# Patient Record
Sex: FEMALE | Race: WHITE
[De-identification: names, ages, dates, MRNs, and addresses within clinical notes are randomized per-mention and may not be internally consistent; named-entity substitution may affect disease eponyms.]

---

## 2017-05-01 ENCOUNTER — HOSPITAL ENCOUNTER (OUTPATIENT)
Dept: HOSPITAL 35 - RAD | Age: 82
End: 2017-05-01
Attending: INTERNAL MEDICINE
Payer: COMMERCIAL

## 2017-05-01 DIAGNOSIS — R06.02: Primary | ICD-10-CM

## 2017-05-01 DIAGNOSIS — J45.909: ICD-10-CM

## 2017-05-05 ENCOUNTER — HOSPITAL ENCOUNTER (EMERGENCY)
Dept: HOSPITAL 35 - ER | Age: 82
Discharge: HOME | End: 2017-05-05
Payer: COMMERCIAL

## 2017-05-05 VITALS — DIASTOLIC BLOOD PRESSURE: 73 MMHG | SYSTOLIC BLOOD PRESSURE: 138 MMHG

## 2017-05-05 VITALS — HEIGHT: 61 IN | BODY MASS INDEX: 28.32 KG/M2 | WEIGHT: 150 LBS

## 2017-05-05 DIAGNOSIS — I10: ICD-10-CM

## 2017-05-05 DIAGNOSIS — Z88.6: ICD-10-CM

## 2017-05-05 DIAGNOSIS — F41.9: ICD-10-CM

## 2017-05-05 DIAGNOSIS — E03.9: ICD-10-CM

## 2017-05-05 DIAGNOSIS — R00.0: Primary | ICD-10-CM
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
## 2017-05-05 DIAGNOSIS — Z91.040: ICD-10-CM

## 2017-05-05 DIAGNOSIS — T38.0X5A: ICD-10-CM

## 2017-05-05 DIAGNOSIS — M81.0: ICD-10-CM

## 2017-05-05 DIAGNOSIS — G47.30: ICD-10-CM

## 2017-05-05 DIAGNOSIS — J45.909: ICD-10-CM

## 2017-05-05 DIAGNOSIS — Z90.89: ICD-10-CM

## 2017-05-05 DIAGNOSIS — E78.5: ICD-10-CM

## 2017-05-05 DIAGNOSIS — Z88.0: ICD-10-CM

## 2017-05-05 LAB
ALBUMIN SERPL-MCNC: 3.7 G/DL (ref 3.4–5)
ALP SERPL-CCNC: 42 U/L (ref 46–116)
ALT SERPL-CCNC: 31 U/L (ref 30–65)
ANION GAP SERPL CALC-SCNC: 7 MMOL/L (ref 7–16)
AST SERPL-CCNC: 25 U/L (ref 15–37)
BASOPHILS NFR BLD AUTO: 0.5 % (ref 0–2)
BILIRUB SERPL-MCNC: 0.3 MG/DL
BUN SERPL-MCNC: 19 MG/DL (ref 7–18)
CALCIUM SERPL-MCNC: 9.5 MG/DL (ref 8.5–10.1)
CHLORIDE SERPL-SCNC: 100 MMOL/L (ref 98–107)
CO2 SERPL-SCNC: 26 MMOL/L (ref 21–32)
CREAT SERPL-MCNC: 0.9 MG/DL (ref 0.6–1)
EOSINOPHIL NFR BLD: 1.8 % (ref 0–3)
ERYTHROCYTE [DISTWIDTH] IN BLOOD BY AUTOMATED COUNT: 13.8 % (ref 10.5–14.5)
GLUCOSE SERPL-MCNC: 93 MG/DL (ref 74–106)
GRANULOCYTES NFR BLD MANUAL: 72.7 % (ref 36–66)
HCT VFR BLD CALC: 36.6 % (ref 37–47)
HGB BLD-MCNC: 12.2 GM/DL (ref 12–15)
LYMPHOCYTES NFR BLD AUTO: 15.4 % (ref 24–44)
MAGNESIUM SERPL-MCNC: 1.9 MG/DL (ref 1.8–2.4)
MANUAL DIFFERENTIAL PERFORMED BLD QL: NO
MCH RBC QN AUTO: 30.2 PG (ref 26–34)
MCHC RBC AUTO-ENTMCNC: 33.4 G/DL (ref 28–37)
MCV RBC: 90.3 FL (ref 80–100)
MONOCYTES NFR BLD: 9.6 % (ref 1–8)
NEUTROPHILS # BLD: 5.2 THOU/UL (ref 1.4–8.2)
NT-PRO BRAIN NAT PEPTIDE: 264 PG/ML (ref ?–300)
PLATELET # BLD: 209 THOU/UL (ref 150–400)
POTASSIUM SERPL-SCNC: 4.1 MMOL/L (ref 3.5–5.1)
PROT SERPL-MCNC: 7 G/DL (ref 6.4–8.2)
RBC # BLD AUTO: 4.05 MIL/UL (ref 4.2–5)
SODIUM SERPL-SCNC: 133 MMOL/L (ref 136–145)
TROPONIN I SERPL-MCNC: < 0.04 NG/ML
WBC # BLD AUTO: 7.2 THOU/UL (ref 4–11)

## 2017-06-16 ENCOUNTER — HOSPITAL ENCOUNTER (OUTPATIENT)
Dept: HOSPITAL 35 - CV | Age: 82
End: 2017-06-16
Attending: INTERNAL MEDICINE
Payer: COMMERCIAL

## 2017-06-16 DIAGNOSIS — I48.91: Primary | ICD-10-CM

## 2017-09-14 ENCOUNTER — HOSPITAL ENCOUNTER (OUTPATIENT)
Dept: HOSPITAL 35 - NUC | Age: 82
End: 2017-09-14
Attending: INTERNAL MEDICINE
Payer: COMMERCIAL

## 2017-09-14 DIAGNOSIS — J45.41: Primary | ICD-10-CM

## 2017-09-14 DIAGNOSIS — R06.00: ICD-10-CM

## 2017-10-04 ENCOUNTER — HOSPITAL ENCOUNTER (OUTPATIENT)
Dept: HOSPITAL 35 - RAD | Age: 82
End: 2017-10-04
Attending: INTERNAL MEDICINE
Payer: COMMERCIAL

## 2017-10-04 DIAGNOSIS — Z12.31: Primary | ICD-10-CM

## 2018-10-16 ENCOUNTER — HOSPITAL ENCOUNTER (OUTPATIENT)
Dept: HOSPITAL 35 - RAD | Age: 83
End: 2018-10-16
Attending: INTERNAL MEDICINE
Payer: COMMERCIAL

## 2018-10-16 DIAGNOSIS — Z12.31: Primary | ICD-10-CM

## 2019-10-21 ENCOUNTER — HOSPITAL ENCOUNTER (OUTPATIENT)
Dept: HOSPITAL 35 - RAD | Age: 84
End: 2019-10-21
Attending: INTERNAL MEDICINE
Payer: COMMERCIAL

## 2019-10-21 DIAGNOSIS — Z12.31: Primary | ICD-10-CM

## 2019-11-05 ENCOUNTER — HOSPITAL ENCOUNTER (OUTPATIENT)
Dept: HOSPITAL 35 - ULTRA | Age: 84
End: 2019-11-05
Attending: INTERNAL MEDICINE
Payer: COMMERCIAL

## 2019-11-05 DIAGNOSIS — N63.20: Primary | ICD-10-CM

## 2020-07-01 ENCOUNTER — HOSPITAL ENCOUNTER (OUTPATIENT)
Dept: HOSPITAL 35 - BC | Age: 85
Discharge: HOME | End: 2020-07-01
Attending: INTERNAL MEDICINE
Payer: COMMERCIAL

## 2020-07-01 DIAGNOSIS — Z79.899: ICD-10-CM

## 2020-07-01 DIAGNOSIS — Z91.040: ICD-10-CM

## 2020-07-01 DIAGNOSIS — Z98.890: ICD-10-CM

## 2020-07-01 DIAGNOSIS — F41.9: ICD-10-CM

## 2020-07-01 DIAGNOSIS — Z88.0: ICD-10-CM

## 2020-07-01 DIAGNOSIS — C50.912: Primary | ICD-10-CM

## 2020-07-08 NOTE — PATH
[facility]
[address]
[place]   [address]                     PATHOLOGY RPT PROCEDURE       
_______________________________________________________________________________
 
Name:       PETTY INFANTE             Room #:                     REG [facility] 
LEMUEL.#:      [id number]     Account #:      [id number]  
Admission:  07/01/20    Date of Birth:  [birth year]  
Discharge:                                              Report #:    [id number]
                                                        Path Case #: [id number]
_______________________________________________________________________________
 
[facility] Accession Number: [id number]
.                                                                01
Material submitted:                                        .
breast - LEFT BREAST BIOPSY. Modifiers: left
.                                                                01
Clinical history:                                          .
Left breast mass
.                                                                02
**********************************************************************
Diagnosis:
Breast, left breast, needle core biopsy:
- INVASIVE MODERATELY DIFFERENTIATED DUCTAL ADENOCARCINOMA, ADELFO
GRADE 2 MEASURING 0.8 CM IN GREATEST DIMENSION IN A SINGLE CORE IN
CONTIGUOUS LENGTH.
(IUV:; 07/02/2020)
MBR  07/02/2020  1156 Local
**********************************************************************
.                                                                02
Comment:
Specimen type:                Needle core biopsy
Tumor site:                   Left breast (biopsy and clip placement)
Tumor quantitation:           0.8 cm
Histologic type:              Invasive ductal carcinoma
Histologic grade:             [place] grade 2
Tubules, nuclei and mitoses:  3, 2 and 1 respectively
LVSI:                         Not identified
Microcalcifications:          Not identified
Markers:                      ER, CA, Ki67 and HER2/amilcar
Block:                        A1
.
Co-review:  Dr. Meri Avila
.
Findings of this case are telephoned to miss Nam in our breast center at
11:04 a.m. on 7/2/2020.
.
(IUV:; 07/02/2020)
.                                                                02
Addendum:                                                       .
Special studies report received from [facility], [address] [place] [address], [address], [place], [address], on case -[id number],
labeled with their number [id number], dated 07/06/2020.
.
Breast/Prognostic Marker Analysis
.
Specimen Site: Lt Breast, Needle Core Biopsy, Breast Cancer
Specimen ID #: [id number]
.
 
 
[address]   [address]                     PATHOLOGY RPT PROCEDURE       
_______________________________________________________________________________
 
Name:       PETTY INFANTE             Room #:                     REG [facility].#:      [id number]     Account #:      [id number]  
Admission:  07/01/20    Date of Birth:  [birth year]  
Discharge:                                              Report #:    [id number]
                                                        Path Case #: [id number]
_______________________________________________________________________________
ER (Estrogen Receptor)
Present/Positive
Percent:  95.00%
Analysis: Manual
Comments: Staining intensity: Strong
.
CA (Progesterone Receptor)
Present/Positive
Percent:  95.00%
Analysis: Manual
Comments: Staining intensity: Moderate To Strong
.
HER2
Equivocal
Score:    2+
Analysis: Manual
Comments: Additional studies: Reflex HER2 by FISH will be reported
separately.
.
Ki-67
Low Proliferation
Percent:  10.00%
Analysis: Manual
.
Time to Fixation (Cold Ischemic Time): Not Provided
Duration of Fixation: Not Provided
Type of Fixative: 10% Neutral Buffered Formalin
.
Comments:
ER/PgR testing at [facility]. is performed in
compliance with the ASCO/CAP Clinical Practice Guidelines. If the result
for ER is less than 1% it is reported as Negative; if the ER result is
1-10% it is reported as Low Positive; if the ER result is greater than 10%
it is reported as Positive. If the result for PgR is less than 1% it is
reported as Negative; if the PgR result is equal to or greater than 1%, it
is reported as Positive.
.
REF: Sylvia SAENZ, Eric JUSTICE, et al: Estrogen and Progesterone Receptor
Testing in Breast Cancer. ASCO/CAP Guideline Update. DOI
10.5858/arpa.8063-4937-XI.
.
Whole slide image capture is performed using [employer]
([employer]) platform. Image analysis, if ordered, is performed using [employer] software.
.
Electronically Signed by Gray Hansen MD on 07/06/2020 07:04 PM MST at
[facility].
Gray [place]
 
 
[place] [address]                     PATHOLOGY RPT PROCEDURE       
_______________________________________________________________________________
 
Name:       INFANTEPETTY             Room #:                     REG CLIFF LANDAVERDE#:      [id number]     Account #:      [id number]  
Admission:  07/01/20    Date of Birth:  [birth year]  
Discharge:                                              Report #:    [id number]
                                                        Path Case #: [id number]
_______________________________________________________________________________
Pathologist
.
Methodology
The HER2 Receptor protein expression is analyzed using the [place] HER2
rabbit monoclonal antibody (clone [id number]). This assay is used for diagnostic
determination of the HER2 protein over-expression in paraffin embedded,
formalin fixed breast cancer tissue on the [place] Benchmark. The specimen
is processed using a secondary antibody-HRP conjugate detection system.
The membrane staining of the tumor is determined either by manual score or
image analysis. This antibody is intended for in vitro diagnostic use. The
score is reported as 0, 1+, 2+, or 3+. This test is used for clinical
purposes.
.
A rabbit monoclonal antibody (clone SP1) that recognized the Estrogen
Receptor is used to perform immunohistochemistry on routinely fixed
(formalin) paraffin embedded tissue on the [place] Benchmark. The specimen
is processed using a secondary antibody-HRP conjugate detection system.
The percentage of stained tumor nuclei is determined either manually or by
image analysis. This test is intended for in vitro diagnostic use. This
test is used for clinical purposes.
.
A rabbit monoclonal antibody (clone 1E2) that recognized the Progesterone
Receptor is used to perform immunohistochemistry on routinely fixed
(formalin) paraffin embedded tissue on the [place] Benchmark. The specimen
is processed using a secondary antibody-HRP conjugate detection system.
The percentage of stained tumor nuclei is determined either manually or by
image analysis. This test is intended for in vitro diagnostic use. This
test is used for clinical purposes.
.
A rabbit monoclonal antibody (clone 30-9) that recognized Ki67 is used to
perform immunohistochemistry on routinely fixed (formalin) paraffin
embedded tissue on the [place] Benchmark. The specimen is processed using
a secondary antibody-HRP conjugate detection system. The percentage of
stained tumor nuclei is determined either manually or by image analysis.
This test is intended for in vitro diagnostic use. This test is used for
clinical purposes.
.
Intended Use:
This antibody is intended for in vitro diagnostic (IVD) use. HER2 (4B5) is
a rabbit monoclonal antibody intended for the semi-quantitative detection
of HER2 antigen in sections of formalin-fixed, paraffin embedded normal
and neoplastic tissue.
.
This antibody is intended for in vitro diagnostic (IVD) use. Estrogen
Receptor (ER) (SP1) is a rabbit monoclonal antibody (IgG) that is intended
for the qualitative detection of estrogen receptor (ER) antigen in
sections of formalin-fixed, paraffin-embedded tissue. ER is a rabbit
monoclonal antibody that recognizes human estrogen receptor alpha.
 
 
[facility]
[address]
[place]   [address]                     PATHOLOGY RPT PROCEDURE       
_______________________________________________________________________________
 
Name:       PETTY INFANTE             Room #:                     REG [facility].#:      [id number]     Account #:      [id number]  
Admission:  07/01/20    Date of Birth:  [birth year]  
Discharge:                                              Report #:    [id number]
                                                        Path Case #: [id number]
_______________________________________________________________________________
.
This antibody is intended for in vitro diagnostic (IVD) use. Progesterone
Receptor (CA) (1E2) is a rabbit monoclonal antibody (IgG) that is intended
for the qualitative detection of progesterone receptor (CA) antigen in
sections of formalin fixed, paraffin embedded tissue. CA is a rabbit
monoclonal antibody that recognizes the A and B forms of the human
progesterone receptor.
.
This antibody is intended for in vitro diagnostic (IVD) use. Ki-67 (30-9)
is a rabbit monoclonal antibody (IgG) directed against C-terminal portion
of Ki-67 antigen. Staining for Ki-67 can be used to aid in assessing the
proliferative activity of normal and neoplastic tissue. Ki-67 is a nuclear
protein expressed in proliferating cells. During the cell cycle, the Ki-67
antigen is present in the G1, S, G2 and M phase but is absent in the G0
(quiescent phase).
.
.
Disclaimer:
This Test was performed by [facility] at [address] [place] [address], [place] [address], [place], [address].
.
[facility] is a business unit of [facility]. a wholly-owned subsidiary of [employer].
.
This assay has not been validated on decalcified tissues. Results should
be interpreted with caution if this specimen was decalcified given the
likelihood of false negativity on decalcified specimens.
.
Any image(s) that accompany this report is/are a representative image(s)
only and should not be used to render a diagnosis.
.
This interpretation is contingent on the specimen and the clinical
information received.
.
For any special tests/stains performed, known positive cells or tissues
are tested with each marker and examined to ensure positivity. Positive
and negative internal controls, if present, react appropriately.
.
This analysis is an adjunct to the evaluation of the referring physician
and does not represent a final diagnosis.
.
The immunohistochemistry tests performed at [facility]. were validated on tissue fixed in 10% neutral buffered
formalin. The performance characteristics of the tests performed on tissue
processed in other fixatives is not known.
.
HER2 testing at [facility] is performed in
 
 
[place] [address]                     PATHOLOGY RPT PROCEDURE       
_______________________________________________________________________________
 
Name:       INFANTEPETTY             Room #:                     REG CLIFF LANDAVERDE#:      [id number]     Account #:      [id number]  
Admission:  07/01/20    Date of Birth:  [birth year]  
Discharge:                                              Report #:    [id number]
                                                        Path Case #: [id number]
_______________________________________________________________________________
compliance with the 2018 updated ASCO/CAP Clinical Practice Guideline
Focused Update. If the result is EQUIVOCAL (2+), it must be confirmed by
an alternative assay such as FISH or Dual RICO.
REF: Dyllan AC, RESHMA Reyes et al: Human Epidermal Growth Factor Receptor 2
Testing in Breast Cancer: ASCO/CAP Clinical Practice Guideline Focused
Update. J Clin Oncol 36:2909-8746, 2018.
.
HER2 and ER/CA ASCO/CAP guidelines require fixation in neutral buffered
formalin for a minimum of 6 and a maximum of 72 hours. Fixation times less
than 6 hours may not adequately preserve cell proteins. Fixation times
longer than 72 hours may cause excess cross-linking of proteins reducing
the antigen available for staining. Either scenario can cause reduced
staining; hence false negative results are possible and should be
considered for these situations if the HER2 IHC score is less than 3+ or
ER or CA is negative (no staining or <1% positive). It is recommended that
specimens fixed longer than 72 hours with HER2 IHC scores less than 3+ be
confirmed by HER2 FISH or Dual RICO. The time from biopsy/excision to
fixation in formalin (cold ischemic time) must be less than 1 hour. Time
to fixation (cold ischemic time) greater than 1 hour should be interpreted
with caution. HER2 testing, mainly HER2 by FISH, is particularly
vulnerable since excessive cold ischemic time results in preferential loss
of HER2 probe signals that may lead to false negative results.
.
SCORE    STAINING PATTERN IN TUMOR CELLS             INTERPRETATION
RESULTS
0        No staining observed or incomplete, faint membrane staining in
         less than or equal to 10% of tumor cells.
                                                     Negative
1+       Incomplete, faint membrane staining in greater than 10% of tumor
         cells.
                                                     Negative
2+       Weak to moderate complete membrane staining observed in greater
         than 10% of tumor cells.
                                                     Equivocal*
                                                     *Must be confirmed by
                                                     alternative assay
                                                     (IHC/FISH/Dual RICO)
3+       Intense, complete membrane staining in greater than 10% of tumor
         cells.
                                                     Positive
.
A complete copy of the report is on file.
.
Professional and Technical services performed by [facility] at [address], [facility] [address], [place] [address].
.
(MLK:rema 07/07/2020)
.
 
 
[address]   [address]                     PATHOLOGY RPT PROCEDURE       
_______________________________________________________________________________
 
Name:       PETTY INFANTE             Room #:                     REG [facility].#:      [id number]     Account #:      [id number]  
Admission:  07/01/20    Date of Birth:  [birth year]  
Discharge:                                              Report #:    [id number]
                                                        Path Case #: [id number]
_______________________________________________________________________________
LBQ/07/08/2020
Addendum Electronically Signed by Sanam Wright MD, Pathologist
.                                                                02
Electronically signed:                                     .
Giselle Marqeuz MD, Pathologist
NPI- [id number]
.                                                                01
Gross description:                                         .
The specimen is received in formalin, labeled "Petty Infante, left
breast mass".  Received are multiple needle cores of fibrofatty tissue
measuring 1.2 x 1.0 x 0.2 cm in aggregate dimensions.  The specimen is
submitted entirely in cassettes A1 through A3.  The specimen is collected
at 11:30 a.m.; the time in formalin is not provided.  The time out of
formalin is 9:50 p.m.
(CAA; 7/1/2020)
QA/QAC  07/01/2020  1640 Local
.                                                                02
Pathologist provided ICD-10:
C50.912
.                                                                02
CPT                                                        .
290440
Specimen Comment: A courtesy copy of this report has been sent to [phone]
[phone]
Specimen Comment: [id number]
Specimen Comment: Report sent to  / DR MAE
***Performed at:  01
   [address], [place]  [id number]
   MD Lalito Hernandez MD Phone:  [phone]
***Performed at:  02
   [facility]  [id number]
   MD Giselle Marquez MD Phone:  [phone]